# Patient Record
Sex: FEMALE | Race: WHITE | NOT HISPANIC OR LATINO
[De-identification: names, ages, dates, MRNs, and addresses within clinical notes are randomized per-mention and may not be internally consistent; named-entity substitution may affect disease eponyms.]

---

## 2017-02-15 ENCOUNTER — APPOINTMENT (OUTPATIENT)
Dept: ULTRASOUND IMAGING | Facility: IMAGING CENTER | Age: 42
End: 2017-02-15

## 2017-02-15 ENCOUNTER — APPOINTMENT (OUTPATIENT)
Dept: MAMMOGRAPHY | Facility: IMAGING CENTER | Age: 42
End: 2017-02-15

## 2017-02-15 ENCOUNTER — OUTPATIENT (OUTPATIENT)
Dept: OUTPATIENT SERVICES | Facility: HOSPITAL | Age: 42
LOS: 1 days | End: 2017-02-15
Payer: COMMERCIAL

## 2017-02-15 DIAGNOSIS — Z00.8 ENCOUNTER FOR OTHER GENERAL EXAMINATION: ICD-10-CM

## 2017-02-15 PROCEDURE — 76641 ULTRASOUND BREAST COMPLETE: CPT

## 2017-02-15 PROCEDURE — 77063 BREAST TOMOSYNTHESIS BI: CPT

## 2017-02-15 PROCEDURE — 77067 SCR MAMMO BI INCL CAD: CPT

## 2017-11-27 ENCOUNTER — RESULT REVIEW (OUTPATIENT)
Age: 42
End: 2017-11-27

## 2017-12-02 ENCOUNTER — TRANSCRIPTION ENCOUNTER (OUTPATIENT)
Age: 42
End: 2017-12-02

## 2018-04-10 ENCOUNTER — APPOINTMENT (OUTPATIENT)
Dept: MAMMOGRAPHY | Facility: IMAGING CENTER | Age: 43
End: 2018-04-10
Payer: COMMERCIAL

## 2018-04-10 ENCOUNTER — OUTPATIENT (OUTPATIENT)
Dept: OUTPATIENT SERVICES | Facility: HOSPITAL | Age: 43
LOS: 1 days | End: 2018-04-10
Payer: COMMERCIAL

## 2018-04-10 ENCOUNTER — APPOINTMENT (OUTPATIENT)
Dept: ULTRASOUND IMAGING | Facility: IMAGING CENTER | Age: 43
End: 2018-04-10

## 2018-04-10 DIAGNOSIS — Z00.8 ENCOUNTER FOR OTHER GENERAL EXAMINATION: ICD-10-CM

## 2018-04-10 PROCEDURE — 77067 SCR MAMMO BI INCL CAD: CPT

## 2018-04-10 PROCEDURE — 76641 ULTRASOUND BREAST COMPLETE: CPT | Mod: 26,50

## 2018-04-10 PROCEDURE — 77063 BREAST TOMOSYNTHESIS BI: CPT | Mod: 26

## 2018-04-10 PROCEDURE — 76641 ULTRASOUND BREAST COMPLETE: CPT

## 2018-04-10 PROCEDURE — 77067 SCR MAMMO BI INCL CAD: CPT | Mod: 26

## 2018-04-10 PROCEDURE — 77063 BREAST TOMOSYNTHESIS BI: CPT

## 2018-05-07 ENCOUNTER — TRANSCRIPTION ENCOUNTER (OUTPATIENT)
Age: 43
End: 2018-05-07

## 2019-01-02 ENCOUNTER — TRANSCRIPTION ENCOUNTER (OUTPATIENT)
Age: 44
End: 2019-01-02

## 2019-03-22 ENCOUNTER — RESULT REVIEW (OUTPATIENT)
Age: 44
End: 2019-03-22

## 2019-04-30 ENCOUNTER — OUTPATIENT (OUTPATIENT)
Dept: OUTPATIENT SERVICES | Facility: HOSPITAL | Age: 44
LOS: 1 days | End: 2019-04-30
Payer: COMMERCIAL

## 2019-04-30 ENCOUNTER — APPOINTMENT (OUTPATIENT)
Dept: MAMMOGRAPHY | Facility: IMAGING CENTER | Age: 44
End: 2019-04-30
Payer: COMMERCIAL

## 2019-04-30 ENCOUNTER — APPOINTMENT (OUTPATIENT)
Dept: ULTRASOUND IMAGING | Facility: IMAGING CENTER | Age: 44
End: 2019-04-30
Payer: COMMERCIAL

## 2019-04-30 DIAGNOSIS — Z00.00 ENCOUNTER FOR GENERAL ADULT MEDICAL EXAMINATION WITHOUT ABNORMAL FINDINGS: ICD-10-CM

## 2019-04-30 PROCEDURE — 77067 SCR MAMMO BI INCL CAD: CPT

## 2019-04-30 PROCEDURE — 76641 ULTRASOUND BREAST COMPLETE: CPT

## 2019-04-30 PROCEDURE — 77063 BREAST TOMOSYNTHESIS BI: CPT

## 2019-04-30 PROCEDURE — 76641 ULTRASOUND BREAST COMPLETE: CPT | Mod: 26,50

## 2019-04-30 PROCEDURE — 77067 SCR MAMMO BI INCL CAD: CPT | Mod: 26

## 2019-04-30 PROCEDURE — 77063 BREAST TOMOSYNTHESIS BI: CPT | Mod: 26

## 2019-05-13 ENCOUNTER — APPOINTMENT (OUTPATIENT)
Dept: ULTRASOUND IMAGING | Facility: IMAGING CENTER | Age: 44
End: 2019-05-13
Payer: COMMERCIAL

## 2019-05-13 ENCOUNTER — OUTPATIENT (OUTPATIENT)
Dept: OUTPATIENT SERVICES | Facility: HOSPITAL | Age: 44
LOS: 1 days | End: 2019-05-13
Payer: COMMERCIAL

## 2019-05-13 ENCOUNTER — APPOINTMENT (OUTPATIENT)
Dept: MAMMOGRAPHY | Facility: IMAGING CENTER | Age: 44
End: 2019-05-13
Payer: COMMERCIAL

## 2019-05-13 DIAGNOSIS — Z00.8 ENCOUNTER FOR OTHER GENERAL EXAMINATION: ICD-10-CM

## 2019-05-13 PROCEDURE — 77065 DX MAMMO INCL CAD UNI: CPT | Mod: 26,RT

## 2019-05-13 PROCEDURE — 76642 ULTRASOUND BREAST LIMITED: CPT

## 2019-05-13 PROCEDURE — 76642 ULTRASOUND BREAST LIMITED: CPT | Mod: 26,RT

## 2019-05-13 PROCEDURE — 77065 DX MAMMO INCL CAD UNI: CPT

## 2019-05-13 PROCEDURE — G0279: CPT | Mod: 26

## 2019-05-13 PROCEDURE — G0279: CPT

## 2019-08-16 ENCOUNTER — EMERGENCY (EMERGENCY)
Facility: HOSPITAL | Age: 44
LOS: 1 days | Discharge: HOME | End: 2019-08-16
Attending: INTERNAL MEDICINE | Admitting: INTERNAL MEDICINE
Payer: COMMERCIAL

## 2019-08-16 VITALS
WEIGHT: 134.92 LBS | HEIGHT: 65 IN | HEART RATE: 90 BPM | TEMPERATURE: 98 F | RESPIRATION RATE: 18 BRPM | SYSTOLIC BLOOD PRESSURE: 119 MMHG | DIASTOLIC BLOOD PRESSURE: 84 MMHG | OXYGEN SATURATION: 99 %

## 2019-08-16 DIAGNOSIS — M79.603 PAIN IN ARM, UNSPECIFIED: ICD-10-CM

## 2019-08-16 PROCEDURE — 99284 EMERGENCY DEPT VISIT MOD MDM: CPT | Mod: 25

## 2019-08-16 PROCEDURE — 73110 X-RAY EXAM OF WRIST: CPT | Mod: 26,RT

## 2019-08-16 PROCEDURE — 96372 THER/PROPH/DIAG INJ SC/IM: CPT | Mod: XU

## 2019-08-16 PROCEDURE — 29125 APPL SHORT ARM SPLINT STATIC: CPT

## 2019-08-16 PROCEDURE — 73090 X-RAY EXAM OF FOREARM: CPT

## 2019-08-16 PROCEDURE — 73090 X-RAY EXAM OF FOREARM: CPT | Mod: 26,LT

## 2019-08-16 PROCEDURE — 73110 X-RAY EXAM OF WRIST: CPT

## 2019-08-16 PROCEDURE — 29125 APPL SHORT ARM SPLINT STATIC: CPT | Mod: RT

## 2019-08-16 RX ORDER — MORPHINE SULFATE 50 MG/1
4 CAPSULE, EXTENDED RELEASE ORAL ONCE
Refills: 0 | Status: DISCONTINUED | OUTPATIENT
Start: 2019-08-16 | End: 2019-08-16

## 2019-08-16 RX ADMIN — MORPHINE SULFATE 4 MILLIGRAM(S): 50 CAPSULE, EXTENDED RELEASE ORAL at 19:00

## 2019-08-16 RX ADMIN — MORPHINE SULFATE 4 MILLIGRAM(S): 50 CAPSULE, EXTENDED RELEASE ORAL at 18:38

## 2019-08-16 NOTE — ED PROVIDER NOTE - CARE PLAN
Principal Discharge DX:	Closed displaced oblique fracture of shaft of right radius, initial encounter Principal Discharge DX:	Closed displaced oblique fracture of shaft of right radius, initial encounter  Secondary Diagnosis:	Closed bent bone fracture of right ulna, initial encounter

## 2019-08-16 NOTE — ED PROVIDER NOTE - CHPI ED SYMPTOMS NEG
no numbness/no tingling/no weakness/no bruising/no stiffness/no difficulty bearing weight/no abrasion/no back pain/no fever
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
## 2019-08-16 NOTE — ED PROVIDER NOTE - CARE PROVIDER_API CALL
Tanmay Kothari)  Orthopaedic Surgery  [address] [facility] [address]  [place] [address]  Phone: [phone]  Fax: [phone]  Follow Up Time:

## 2019-08-16 NOTE — ED PROVIDER NOTE - NSFOLLOWUPINSTRUCTIONS_ED_ALL_ED_FT
Radial Fracture  Image   A radial fracture is a break in the radius bone. The radius is a bone in the forearm, on the same side as the thumb. The forearm is the part of the arm that is between the elbow and the wrist. A radial fracture near the wrist (distal radialfracture) is the most common type of broken arm. A fracture can also occur near the elbow (radial head fracture).    What are the causes?  The most common cause of a radial fracture is falling with the arm outstretched. Other causes include:  An accident, such as a car or bike accident.  A hard, direct hit to the forearm.  What increases the risk?  You may be at greater risk for a radial fracture if you:  Are female.  Are an older adult.  Play contact sports.  Have a condition that causes your bones to become thin and brittle (osteoporosis).  What are the signs or symptoms?  A radial fracture causes pain immediately after the injury. Other signs and symptoms may include:  An abnormal bend or bump in the arm (deformity).  Swelling.  Bruising.  Numbness or tingling in your arm and hand.  Limited movement of your arm and hand.  How is this diagnosed?  This condition may be diagnosed based on:  Your symptoms and medical history.  A physical exam.  An X-ray.  How is this treated?  Treatment depends on how severe your fracture is, where it is, and how the pieces of the broken bone line up with each other (alignment).  The first step may be for you to wear a temporary splint for a few days, until your swelling goes down. After the swelling goes down, you may get a cast, get a different type of splint, or have surgery.  If your broken bone is in good alignment, you will need to wear a splint or cast for up to 6 weeks.  If your broken bone is not aligned (is displaced), your health care provider will need to align the bone pieces. After alignment, you will need to wear a splint or cast for up to 6 weeks. To align your broken bone, your health care provider may:  Move the bones back into position without surgery (closed reduction).  Perform surgery to align the fracture and fix the bone pieces into place with metal screws, plates, or wires (open reduction and internal fixation, ORIF).  Perform surgery to align the fracture and fix the bone pieces into place with pins that are attached to a stabilizing bar outside your skin (external fixation).  Treatment may also include:  Having your cast changed after 2–3 weeks.  Physical therapy.  Follow-up visits and X-rays to make sure you are healing.  Follow these instructions at home:  If you have a splint:     Wear it as told by your health care provider. Remove it only as told by your health care provider.   Loosen the splint if your fingers tingle, become numb, or turn cold and blue.   Keep the splint clean and dry.  If you have a cast:     Do not stick anything inside the cast to scratch your skin. Doing that increases your risk for infection.   Check the skin around the cast every day. Tell your health care provider about any concerns.   You may put lotion on dry skin around the edges of the cast. Do not put lotion on the skin underneath the cast.  Keep the cast clean and dry.  Bathing     Do not take baths, swim, or use a hot tub until your health care provider approves. Ask your health care provider if you may take showers. You may only be allowed to take sponge baths.  If your splint or cast is not waterproof:  Do not let it get wet.  Cover it with a watertight covering when you take a bath or a shower.  Activity     Do not lift anything with your injured arm.  Do not use the injured arm to support your body weight until your health care provider says that you can.  Ask your health care provider what activities are safe for you during recovery, and ask what activities you need to avoid.  Managing pain, stiffness, and swelling     Image   If directed, put ice on painful areas:   If you have a removable splint, remove it as told by your health care provider.   Put ice in a plastic bag.   Place a towel between your skin and the bag, or between your cast and the bag.  Leave the ice on for 20 minutes, 2–3 times a day.  Move your fingers often to avoid stiffness and to lessen swelling.   Raise (elevate) your arm above the level of your heart while you are sitting or lying down.  General instructions     Do not put pressure on any part of the cast or splint until it is fully hardened, if applicable. This may take several hours.   Take over-the-counter and prescription medicines only as told by your health care provider.  Do not drive until your health care provider approves. You should not drive or use heavy machinery while taking prescription pain medicine.   Do not use any products that contain nicotine or tobacco, such as cigarettes and e-cigarettes. These can delay bone healing. If you need help quitting, ask your health care provider.  Keep all follow-up visits as told by your health care provider. This is important.  Contact a health care provider if you have:  Pain that does not get better with medicine.  Swelling that gets worse.  A bad smell coming from your cast.  Get help right away if:  You cannot move your fingers.  You have severe pain.  Your fingers or your hand:  Become numb, cold, or pale.  Turn a bluish color.  Summary  A radial fracture is a break in the radius bone. The radius is in the forearm, on the same side as the thumb.  Treatment depends on how severe your fracture is, where it is, and how the pieces of the broken bone line up with each other.  A splint or cast may be needed to help the fracture heal. A more severe break may require surgery.  This information is not intended to replace advice given to you by your health care provider. Make sure you discuss any questions you have with your health care provider.    Document Released: 05/31/2007 Document Revised: 12/12/2018 Document Reviewed: 12/12/2018  [employer] Interactive Patient Education © 2019 [employer]
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
## 2019-08-20 ENCOUNTER — APPOINTMENT (OUTPATIENT)
Dept: ORTHOPEDIC SURGERY | Facility: CLINIC | Age: 44
End: 2019-08-20

## 2019-11-18 ENCOUNTER — TRANSCRIPTION ENCOUNTER (OUTPATIENT)
Age: 44
End: 2019-11-18

## 2020-05-12 ENCOUNTER — RESULT REVIEW (OUTPATIENT)
Age: 45
End: 2020-05-12

## 2020-06-11 ENCOUNTER — APPOINTMENT (OUTPATIENT)
Dept: MAMMOGRAPHY | Facility: IMAGING CENTER | Age: 45
End: 2020-06-11
Payer: COMMERCIAL

## 2020-06-11 ENCOUNTER — OUTPATIENT (OUTPATIENT)
Dept: OUTPATIENT SERVICES | Facility: HOSPITAL | Age: 45
LOS: 1 days | End: 2020-06-11
Payer: COMMERCIAL

## 2020-06-11 ENCOUNTER — APPOINTMENT (OUTPATIENT)
Dept: ULTRASOUND IMAGING | Facility: IMAGING CENTER | Age: 45
End: 2020-06-11
Payer: COMMERCIAL

## 2020-06-11 DIAGNOSIS — Z00.8 ENCOUNTER FOR OTHER GENERAL EXAMINATION: ICD-10-CM

## 2020-06-11 PROCEDURE — 76641 ULTRASOUND BREAST COMPLETE: CPT | Mod: 26,50

## 2020-06-11 PROCEDURE — 77067 SCR MAMMO BI INCL CAD: CPT | Mod: 26

## 2020-06-11 PROCEDURE — 76641 ULTRASOUND BREAST COMPLETE: CPT

## 2020-06-11 PROCEDURE — 77063 BREAST TOMOSYNTHESIS BI: CPT | Mod: 26

## 2020-06-11 PROCEDURE — 77067 SCR MAMMO BI INCL CAD: CPT

## 2020-06-11 PROCEDURE — 77063 BREAST TOMOSYNTHESIS BI: CPT

## 2020-06-23 ENCOUNTER — APPOINTMENT (OUTPATIENT)
Dept: ULTRASOUND IMAGING | Facility: IMAGING CENTER | Age: 45
End: 2020-06-23
Payer: COMMERCIAL

## 2020-06-23 ENCOUNTER — APPOINTMENT (OUTPATIENT)
Dept: MAMMOGRAPHY | Facility: IMAGING CENTER | Age: 45
End: 2020-06-23
Payer: COMMERCIAL

## 2020-06-23 ENCOUNTER — OUTPATIENT (OUTPATIENT)
Dept: OUTPATIENT SERVICES | Facility: HOSPITAL | Age: 45
LOS: 1 days | End: 2020-06-23
Payer: COMMERCIAL

## 2020-06-23 DIAGNOSIS — Z00.8 ENCOUNTER FOR OTHER GENERAL EXAMINATION: ICD-10-CM

## 2020-06-23 PROCEDURE — 76642 ULTRASOUND BREAST LIMITED: CPT | Mod: 26,RT

## 2020-06-23 PROCEDURE — 76642 ULTRASOUND BREAST LIMITED: CPT

## 2020-06-23 PROCEDURE — 77065 DX MAMMO INCL CAD UNI: CPT | Mod: 26,RT

## 2020-06-23 PROCEDURE — G0279: CPT

## 2020-06-23 PROCEDURE — G0279: CPT | Mod: 26

## 2020-06-23 PROCEDURE — 77065 DX MAMMO INCL CAD UNI: CPT

## 2020-09-29 ENCOUNTER — OUTPATIENT (OUTPATIENT)
Dept: OUTPATIENT SERVICES | Facility: HOSPITAL | Age: 45
LOS: 1 days | End: 2020-09-29
Payer: COMMERCIAL

## 2020-09-29 ENCOUNTER — APPOINTMENT (OUTPATIENT)
Dept: MRI IMAGING | Facility: IMAGING CENTER | Age: 45
End: 2020-09-29
Payer: COMMERCIAL

## 2020-09-29 DIAGNOSIS — Z00.8 ENCOUNTER FOR OTHER GENERAL EXAMINATION: ICD-10-CM

## 2020-09-29 DIAGNOSIS — Z91.89 OTHER SPECIFIED PERSONAL RISK FACTORS, NOT ELSEWHERE CLASSIFIED: ICD-10-CM

## 2020-09-29 PROCEDURE — C8937: CPT

## 2020-09-29 PROCEDURE — C8908: CPT

## 2020-09-29 PROCEDURE — 77049 MRI BREAST C-+ W/CAD BI: CPT | Mod: 26

## 2020-09-29 PROCEDURE — A9585: CPT

## 2020-09-30 ENCOUNTER — TRANSCRIPTION ENCOUNTER (OUTPATIENT)
Age: 45
End: 2020-09-30

## 2020-10-15 ENCOUNTER — APPOINTMENT (OUTPATIENT)
Dept: MRI IMAGING | Facility: IMAGING CENTER | Age: 45
End: 2020-10-15
Payer: COMMERCIAL

## 2020-10-15 ENCOUNTER — OUTPATIENT (OUTPATIENT)
Dept: OUTPATIENT SERVICES | Facility: HOSPITAL | Age: 45
LOS: 1 days | End: 2020-10-15
Payer: COMMERCIAL

## 2020-10-15 DIAGNOSIS — Z91.89 OTHER SPECIFIED PERSONAL RISK FACTORS, NOT ELSEWHERE CLASSIFIED: ICD-10-CM

## 2020-10-15 DIAGNOSIS — Z80.3 FAMILY HISTORY OF MALIGNANT NEOPLASM OF BREAST: ICD-10-CM

## 2020-10-15 DIAGNOSIS — R92.2 INCONCLUSIVE MAMMOGRAM: ICD-10-CM

## 2020-10-15 PROCEDURE — 74183 MRI ABD W/O CNTR FLWD CNTR: CPT | Mod: 26

## 2020-10-15 PROCEDURE — 74183 MRI ABD W/O CNTR FLWD CNTR: CPT

## 2020-10-15 PROCEDURE — A9585: CPT

## 2021-01-04 ENCOUNTER — OUTPATIENT (OUTPATIENT)
Dept: OUTPATIENT SERVICES | Facility: HOSPITAL | Age: 46
LOS: 1 days | End: 2021-01-04
Payer: COMMERCIAL

## 2021-01-04 ENCOUNTER — APPOINTMENT (OUTPATIENT)
Dept: ULTRASOUND IMAGING | Facility: IMAGING CENTER | Age: 46
End: 2021-01-04
Payer: COMMERCIAL

## 2021-01-04 ENCOUNTER — APPOINTMENT (OUTPATIENT)
Dept: MAMMOGRAPHY | Facility: IMAGING CENTER | Age: 46
End: 2021-01-04
Payer: COMMERCIAL

## 2021-01-04 DIAGNOSIS — Z91.89 OTHER SPECIFIED PERSONAL RISK FACTORS, NOT ELSEWHERE CLASSIFIED: ICD-10-CM

## 2021-01-04 PROCEDURE — 77061 BREAST TOMOSYNTHESIS UNI: CPT | Mod: MG

## 2021-01-04 PROCEDURE — G0279: CPT | Mod: 26

## 2021-01-04 PROCEDURE — G0279: CPT

## 2021-01-04 PROCEDURE — 77065 DX MAMMO INCL CAD UNI: CPT | Mod: 26,RT

## 2021-01-04 PROCEDURE — 77065 DX MAMMO INCL CAD UNI: CPT

## 2021-03-17 ENCOUNTER — OUTPATIENT (OUTPATIENT)
Dept: OUTPATIENT SERVICES | Facility: HOSPITAL | Age: 46
LOS: 1 days | End: 2021-03-17
Payer: COMMERCIAL

## 2021-03-17 ENCOUNTER — APPOINTMENT (OUTPATIENT)
Dept: MRI IMAGING | Facility: IMAGING CENTER | Age: 46
End: 2021-03-17
Payer: COMMERCIAL

## 2021-03-17 DIAGNOSIS — Z91.89 OTHER SPECIFIED PERSONAL RISK FACTORS, NOT ELSEWHERE CLASSIFIED: ICD-10-CM

## 2021-03-17 PROCEDURE — C8937: CPT

## 2021-03-17 PROCEDURE — A9585: CPT

## 2021-03-17 PROCEDURE — 77049 MRI BREAST C-+ W/CAD BI: CPT | Mod: 26

## 2021-03-17 PROCEDURE — C8908: CPT

## 2021-06-17 ENCOUNTER — APPOINTMENT (OUTPATIENT)
Dept: ULTRASOUND IMAGING | Facility: IMAGING CENTER | Age: 46
End: 2021-06-17
Payer: COMMERCIAL

## 2021-06-17 ENCOUNTER — APPOINTMENT (OUTPATIENT)
Dept: MAMMOGRAPHY | Facility: IMAGING CENTER | Age: 46
End: 2021-06-17
Payer: COMMERCIAL

## 2021-06-17 ENCOUNTER — OUTPATIENT (OUTPATIENT)
Dept: OUTPATIENT SERVICES | Facility: HOSPITAL | Age: 46
LOS: 1 days | End: 2021-06-17
Payer: COMMERCIAL

## 2021-06-17 DIAGNOSIS — Z00.8 ENCOUNTER FOR OTHER GENERAL EXAMINATION: ICD-10-CM

## 2021-06-17 PROCEDURE — 77067 SCR MAMMO BI INCL CAD: CPT

## 2021-06-17 PROCEDURE — 77063 BREAST TOMOSYNTHESIS BI: CPT

## 2021-06-17 PROCEDURE — 76641 ULTRASOUND BREAST COMPLETE: CPT | Mod: 26,50

## 2021-06-17 PROCEDURE — 77063 BREAST TOMOSYNTHESIS BI: CPT | Mod: 26

## 2021-06-17 PROCEDURE — 77067 SCR MAMMO BI INCL CAD: CPT | Mod: 26

## 2021-06-17 PROCEDURE — 76641 ULTRASOUND BREAST COMPLETE: CPT

## 2022-03-08 ENCOUNTER — APPOINTMENT (OUTPATIENT)
Dept: OBGYN | Facility: CLINIC | Age: 47
End: 2022-03-08
Payer: COMMERCIAL

## 2022-03-08 VITALS
DIASTOLIC BLOOD PRESSURE: 85 MMHG | HEIGHT: 65 IN | SYSTOLIC BLOOD PRESSURE: 136 MMHG | BODY MASS INDEX: 23.32 KG/M2 | WEIGHT: 140 LBS

## 2022-03-08 DIAGNOSIS — Z12.39 ENCOUNTER FOR OTHER SCREENING FOR MALIGNANT NEOPLASM OF BREAST: ICD-10-CM

## 2022-03-08 DIAGNOSIS — Z80.3 FAMILY HISTORY OF MALIGNANT NEOPLASM OF BREAST: ICD-10-CM

## 2022-03-08 PROCEDURE — 82270 OCCULT BLOOD FECES: CPT

## 2022-03-08 PROCEDURE — 99396 PREV VISIT EST AGE 40-64: CPT

## 2022-03-08 RX ORDER — LEVOTHYROXINE SODIUM 137 UG/1
TABLET ORAL
Refills: 0 | Status: ACTIVE | COMMUNITY

## 2022-03-09 LAB
25(OH)D3 SERPL-MCNC: 21.6 NG/ML
ALBUMIN SERPL ELPH-MCNC: 4.7 G/DL
ALP BLD-CCNC: 35 U/L
ALT SERPL-CCNC: 87 U/L
ANION GAP SERPL CALC-SCNC: 15 MMOL/L
AST SERPL-CCNC: 59 U/L
BASOPHILS # BLD AUTO: 0.02 K/UL
BASOPHILS NFR BLD AUTO: 0.4 %
BILIRUB SERPL-MCNC: 0.9 MG/DL
BUN SERPL-MCNC: 9 MG/DL
CALCIUM SERPL-MCNC: 9.4 MG/DL
CHLORIDE SERPL-SCNC: 102 MMOL/L
CHOLEST SERPL-MCNC: 151 MG/DL
CO2 SERPL-SCNC: 24 MMOL/L
CREAT SERPL-MCNC: 0.69 MG/DL
EGFR: 108 ML/MIN/1.73M2
EOSINOPHIL # BLD AUTO: 0.03 K/UL
EOSINOPHIL NFR BLD AUTO: 0.6 %
ESTIMATED AVERAGE GLUCOSE: 103 MG/DL
GLUCOSE SERPL-MCNC: 66 MG/DL
HBA1C MFR BLD HPLC: 5.2 %
HCT VFR BLD CALC: 39.7 %
HDLC SERPL-MCNC: 60 MG/DL
HGB BLD-MCNC: 13 G/DL
HPV HIGH+LOW RISK DNA PNL CVX: NOT DETECTED
IMM GRANULOCYTES NFR BLD AUTO: 0.4 %
LDLC SERPL CALC-MCNC: 78 MG/DL
LYMPHOCYTES # BLD AUTO: 1.42 K/UL
LYMPHOCYTES NFR BLD AUTO: 28.1 %
MAN DIFF?: NORMAL
MCHC RBC-ENTMCNC: 30.7 PG
MCHC RBC-ENTMCNC: 32.7 GM/DL
MCV RBC AUTO: 93.6 FL
MONOCYTES # BLD AUTO: 0.28 K/UL
MONOCYTES NFR BLD AUTO: 5.5 %
NEUTROPHILS # BLD AUTO: 3.28 K/UL
NEUTROPHILS NFR BLD AUTO: 65 %
NONHDLC SERPL-MCNC: 91 MG/DL
PLATELET # BLD AUTO: 163 K/UL
POTASSIUM SERPL-SCNC: 4 MMOL/L
PROT SERPL-MCNC: 7.1 G/DL
RBC # BLD: 4.24 M/UL
RBC # FLD: 12.5 %
SODIUM SERPL-SCNC: 141 MMOL/L
TRIGL SERPL-MCNC: 67 MG/DL
WBC # FLD AUTO: 5.05 K/UL

## 2022-03-14 ENCOUNTER — APPOINTMENT (OUTPATIENT)
Dept: ULTRASOUND IMAGING | Facility: IMAGING CENTER | Age: 47
End: 2022-03-14
Payer: COMMERCIAL

## 2022-03-14 ENCOUNTER — OUTPATIENT (OUTPATIENT)
Dept: OUTPATIENT SERVICES | Facility: HOSPITAL | Age: 47
LOS: 1 days | End: 2022-03-14
Payer: COMMERCIAL

## 2022-03-14 DIAGNOSIS — R79.89 OTHER SPECIFIED ABNORMAL FINDINGS OF BLOOD CHEMISTRY: ICD-10-CM

## 2022-03-14 DIAGNOSIS — Z00.8 ENCOUNTER FOR OTHER GENERAL EXAMINATION: ICD-10-CM

## 2022-03-14 LAB — CYTOLOGY CVX/VAG DOC THIN PREP: NORMAL

## 2022-03-14 PROCEDURE — 76700 US EXAM ABDOM COMPLETE: CPT | Mod: 26

## 2022-03-14 PROCEDURE — 76700 US EXAM ABDOM COMPLETE: CPT

## 2022-03-16 ENCOUNTER — NON-APPOINTMENT (OUTPATIENT)
Age: 47
End: 2022-03-16

## 2022-03-28 ENCOUNTER — APPOINTMENT (OUTPATIENT)
Dept: OBGYN | Facility: CLINIC | Age: 47
End: 2022-03-28
Payer: COMMERCIAL

## 2022-03-28 ENCOUNTER — ASOB RESULT (OUTPATIENT)
Age: 47
End: 2022-03-28

## 2022-03-28 PROCEDURE — 76830 TRANSVAGINAL US NON-OB: CPT

## 2022-03-30 ENCOUNTER — NON-APPOINTMENT (OUTPATIENT)
Age: 47
End: 2022-03-30

## 2022-06-20 ENCOUNTER — OUTPATIENT (OUTPATIENT)
Dept: OUTPATIENT SERVICES | Facility: HOSPITAL | Age: 47
LOS: 1 days | End: 2022-06-20
Payer: COMMERCIAL

## 2022-06-20 ENCOUNTER — APPOINTMENT (OUTPATIENT)
Dept: MAMMOGRAPHY | Facility: IMAGING CENTER | Age: 47
End: 2022-06-20
Payer: COMMERCIAL

## 2022-06-20 ENCOUNTER — APPOINTMENT (OUTPATIENT)
Dept: ULTRASOUND IMAGING | Facility: IMAGING CENTER | Age: 47
End: 2022-06-20
Payer: COMMERCIAL

## 2022-06-20 DIAGNOSIS — Z00.8 ENCOUNTER FOR OTHER GENERAL EXAMINATION: ICD-10-CM

## 2022-06-20 PROCEDURE — 77067 SCR MAMMO BI INCL CAD: CPT | Mod: 26

## 2022-06-20 PROCEDURE — 76641 ULTRASOUND BREAST COMPLETE: CPT | Mod: 26,50

## 2022-06-20 PROCEDURE — 76641 ULTRASOUND BREAST COMPLETE: CPT

## 2022-06-20 PROCEDURE — 77063 BREAST TOMOSYNTHESIS BI: CPT

## 2022-06-20 PROCEDURE — 77067 SCR MAMMO BI INCL CAD: CPT

## 2022-06-20 PROCEDURE — 77063 BREAST TOMOSYNTHESIS BI: CPT | Mod: 26

## 2022-10-08 ENCOUNTER — NON-APPOINTMENT (OUTPATIENT)
Age: 47
End: 2022-10-08

## 2023-01-03 ENCOUNTER — NON-APPOINTMENT (OUTPATIENT)
Age: 48
End: 2023-01-03

## 2023-08-07 ENCOUNTER — OUTPATIENT (OUTPATIENT)
Dept: OUTPATIENT SERVICES | Facility: HOSPITAL | Age: 48
LOS: 1 days | End: 2023-08-07
Payer: COMMERCIAL

## 2023-08-07 ENCOUNTER — APPOINTMENT (OUTPATIENT)
Dept: ULTRASOUND IMAGING | Facility: IMAGING CENTER | Age: 48
End: 2023-08-07
Payer: COMMERCIAL

## 2023-08-07 ENCOUNTER — APPOINTMENT (OUTPATIENT)
Dept: MAMMOGRAPHY | Facility: IMAGING CENTER | Age: 48
End: 2023-08-07
Payer: COMMERCIAL

## 2023-08-07 DIAGNOSIS — Z00.8 ENCOUNTER FOR OTHER GENERAL EXAMINATION: ICD-10-CM

## 2023-08-07 PROCEDURE — 77067 SCR MAMMO BI INCL CAD: CPT | Mod: 26

## 2023-08-07 PROCEDURE — 76641 ULTRASOUND BREAST COMPLETE: CPT

## 2023-08-07 PROCEDURE — 76641 ULTRASOUND BREAST COMPLETE: CPT | Mod: 26,50

## 2023-08-07 PROCEDURE — 77063 BREAST TOMOSYNTHESIS BI: CPT | Mod: 26

## 2023-08-07 PROCEDURE — 77067 SCR MAMMO BI INCL CAD: CPT

## 2023-08-07 PROCEDURE — 77063 BREAST TOMOSYNTHESIS BI: CPT

## 2023-10-30 ENCOUNTER — APPOINTMENT (OUTPATIENT)
Dept: OBGYN | Facility: CLINIC | Age: 48
End: 2023-10-30
Payer: COMMERCIAL

## 2023-10-30 VITALS
SYSTOLIC BLOOD PRESSURE: 123 MMHG | WEIGHT: 136 LBS | HEIGHT: 65 IN | BODY MASS INDEX: 22.66 KG/M2 | DIASTOLIC BLOOD PRESSURE: 78 MMHG

## 2023-10-30 DIAGNOSIS — Z01.419 ENCOUNTER FOR GYNECOLOGICAL EXAMINATION (GENERAL) (ROUTINE) W/OUT ABNORMAL FINDINGS: ICD-10-CM

## 2023-10-30 DIAGNOSIS — N89.8 OTHER SPECIFIED NONINFLAMMATORY DISORDERS OF VAGINA: ICD-10-CM

## 2023-10-30 PROCEDURE — 99396 PREV VISIT EST AGE 40-64: CPT

## 2023-10-30 PROCEDURE — 82270 OCCULT BLOOD FECES: CPT

## 2023-11-02 LAB
A VAGINAE DNA VAG QL NAA+PROBE: NORMAL
BACTERIA GENITAL AEROBE CULT: NORMAL
BVAB2 DNA VAG QL NAA+PROBE: NORMAL
C KRUSEI DNA VAG QL NAA+PROBE: NEGATIVE
C TRACH RRNA SPEC QL NAA+PROBE: NEGATIVE
CANDIDA DNA VAG QL NAA+PROBE: NEGATIVE
CYTOLOGY CVX/VAG DOC THIN PREP: NORMAL
HPV HIGH+LOW RISK DNA PNL CVX: NOT DETECTED
MEGA1 DNA VAG QL NAA+PROBE: NORMAL
N GONORRHOEA RRNA SPEC QL NAA+PROBE: NEGATIVE
T VAGINALIS RRNA SPEC QL NAA+PROBE: NEGATIVE

## 2023-11-07 ENCOUNTER — NON-APPOINTMENT (OUTPATIENT)
Age: 48
End: 2023-11-07

## 2023-11-08 ENCOUNTER — APPOINTMENT (OUTPATIENT)
Dept: OBGYN | Facility: CLINIC | Age: 48
End: 2023-11-08

## 2023-11-15 ENCOUNTER — APPOINTMENT (OUTPATIENT)
Dept: OBGYN | Facility: CLINIC | Age: 48
End: 2023-11-15
Payer: COMMERCIAL

## 2023-11-15 ENCOUNTER — ASOB RESULT (OUTPATIENT)
Age: 48
End: 2023-11-15

## 2023-11-15 PROCEDURE — 76856 US EXAM PELVIC COMPLETE: CPT | Mod: 59

## 2023-11-15 PROCEDURE — 76830 TRANSVAGINAL US NON-OB: CPT

## 2024-03-04 ENCOUNTER — APPOINTMENT (OUTPATIENT)
Dept: OBGYN | Facility: CLINIC | Age: 49
End: 2024-03-04
Payer: COMMERCIAL

## 2024-03-04 ENCOUNTER — ASOB RESULT (OUTPATIENT)
Age: 49
End: 2024-03-04

## 2024-03-04 PROCEDURE — 76830 TRANSVAGINAL US NON-OB: CPT

## 2024-04-19 ENCOUNTER — OUTPATIENT (OUTPATIENT)
Dept: OUTPATIENT SERVICES | Facility: HOSPITAL | Age: 49
LOS: 1 days | End: 2024-04-19
Payer: COMMERCIAL

## 2024-04-19 ENCOUNTER — APPOINTMENT (OUTPATIENT)
Dept: MRI IMAGING | Facility: IMAGING CENTER | Age: 49
End: 2024-04-19
Payer: COMMERCIAL

## 2024-04-19 DIAGNOSIS — Z91.89 OTHER SPECIFIED PERSONAL RISK FACTORS, NOT ELSEWHERE CLASSIFIED: ICD-10-CM

## 2024-04-19 DIAGNOSIS — Z80.3 FAMILY HISTORY OF MALIGNANT NEOPLASM OF BREAST: ICD-10-CM

## 2024-04-19 DIAGNOSIS — R92.2 INCONCLUSIVE MAMMOGRAM: ICD-10-CM

## 2024-04-19 PROCEDURE — C8937: CPT

## 2024-04-19 PROCEDURE — A9585: CPT

## 2024-04-19 PROCEDURE — C8908: CPT

## 2024-04-19 PROCEDURE — 77049 MRI BREAST C-+ W/CAD BI: CPT | Mod: 26

## 2024-10-07 ENCOUNTER — APPOINTMENT (OUTPATIENT)
Dept: OBGYN | Facility: CLINIC | Age: 49
End: 2024-10-07

## 2024-10-08 ENCOUNTER — APPOINTMENT (OUTPATIENT)
Dept: MAMMOGRAPHY | Facility: IMAGING CENTER | Age: 49
End: 2024-10-08
Payer: COMMERCIAL

## 2024-10-08 ENCOUNTER — APPOINTMENT (OUTPATIENT)
Dept: ULTRASOUND IMAGING | Facility: IMAGING CENTER | Age: 49
End: 2024-10-08
Payer: COMMERCIAL

## 2024-10-08 ENCOUNTER — OUTPATIENT (OUTPATIENT)
Dept: OUTPATIENT SERVICES | Facility: HOSPITAL | Age: 49
LOS: 1 days | End: 2024-10-08
Payer: COMMERCIAL

## 2024-10-08 DIAGNOSIS — R92.2 INCONCLUSIVE MAMMOGRAM: ICD-10-CM

## 2024-10-08 DIAGNOSIS — Z91.89 OTHER SPECIFIED PERSONAL RISK FACTORS, NOT ELSEWHERE CLASSIFIED: ICD-10-CM

## 2024-10-08 DIAGNOSIS — Z80.3 FAMILY HISTORY OF MALIGNANT NEOPLASM OF BREAST: ICD-10-CM

## 2024-10-08 PROCEDURE — 77067 SCR MAMMO BI INCL CAD: CPT | Mod: 26

## 2024-10-08 PROCEDURE — 76641 ULTRASOUND BREAST COMPLETE: CPT | Mod: 26,50

## 2024-10-08 PROCEDURE — 77063 BREAST TOMOSYNTHESIS BI: CPT | Mod: 26

## 2024-10-08 PROCEDURE — 76641 ULTRASOUND BREAST COMPLETE: CPT

## 2024-10-08 PROCEDURE — 77067 SCR MAMMO BI INCL CAD: CPT

## 2024-10-08 PROCEDURE — 77063 BREAST TOMOSYNTHESIS BI: CPT

## 2024-10-16 ENCOUNTER — APPOINTMENT (OUTPATIENT)
Dept: OBGYN | Facility: CLINIC | Age: 49
End: 2024-10-16
Payer: COMMERCIAL

## 2024-10-16 ENCOUNTER — ASOB RESULT (OUTPATIENT)
Age: 49
End: 2024-10-16

## 2024-10-16 PROCEDURE — 76830 TRANSVAGINAL US NON-OB: CPT

## 2024-10-17 ENCOUNTER — NON-APPOINTMENT (OUTPATIENT)
Age: 49
End: 2024-10-17

## 2024-11-05 ENCOUNTER — NON-APPOINTMENT (OUTPATIENT)
Age: 49
End: 2024-11-05

## 2024-11-05 ENCOUNTER — APPOINTMENT (OUTPATIENT)
Dept: OBGYN | Facility: CLINIC | Age: 49
End: 2024-11-05
Payer: COMMERCIAL

## 2024-11-05 VITALS
BODY MASS INDEX: 22.49 KG/M2 | HEIGHT: 65 IN | SYSTOLIC BLOOD PRESSURE: 126 MMHG | DIASTOLIC BLOOD PRESSURE: 77 MMHG | WEIGHT: 135 LBS

## 2024-11-05 DIAGNOSIS — Z86.39 PERSONAL HISTORY OF OTHER ENDOCRINE, NUTRITIONAL AND METABOLIC DISEASE: ICD-10-CM

## 2024-11-05 DIAGNOSIS — Z01.419 ENCOUNTER FOR GYNECOLOGICAL EXAMINATION (GENERAL) (ROUTINE) W/OUT ABNORMAL FINDINGS: ICD-10-CM

## 2024-11-05 DIAGNOSIS — N89.8 OTHER SPECIFIED NONINFLAMMATORY DISORDERS OF VAGINA: ICD-10-CM

## 2024-11-05 DIAGNOSIS — Z13.31 ENCOUNTER FOR SCREENING FOR DEPRESSION: ICD-10-CM

## 2024-11-05 PROCEDURE — 82270 OCCULT BLOOD FECES: CPT

## 2024-11-05 PROCEDURE — 99459 PELVIC EXAMINATION: CPT

## 2024-11-05 PROCEDURE — 99396 PREV VISIT EST AGE 40-64: CPT

## 2024-11-06 LAB — HPV HIGH+LOW RISK DNA PNL CVX: NOT DETECTED

## 2024-11-07 ENCOUNTER — NON-APPOINTMENT (OUTPATIENT)
Age: 49
End: 2024-11-07

## 2024-11-10 LAB
A VAGINAE DNA VAG QL NAA+PROBE: NORMAL
BACTERIA GENITAL AEROBE CULT: NORMAL
BVAB2 DNA VAG QL NAA+PROBE: NORMAL
C KRUSEI DNA VAG QL NAA+PROBE: NEGATIVE
C TRACH RRNA SPEC QL NAA+PROBE: NEGATIVE
CANDIDA DNA VAG QL NAA+PROBE: NEGATIVE
MEGA1 DNA VAG QL NAA+PROBE: NORMAL
N GONORRHOEA RRNA SPEC QL NAA+PROBE: NEGATIVE
T VAGINALIS RRNA SPEC QL NAA+PROBE: NEGATIVE

## 2024-11-13 LAB — CYTOLOGY CVX/VAG DOC THIN PREP: NORMAL
